# Patient Record
Sex: MALE | Race: WHITE | NOT HISPANIC OR LATINO | Employment: FULL TIME | URBAN - METROPOLITAN AREA
[De-identification: names, ages, dates, MRNs, and addresses within clinical notes are randomized per-mention and may not be internally consistent; named-entity substitution may affect disease eponyms.]

---

## 2018-03-02 ENCOUNTER — TELEPHONE (OUTPATIENT)
Dept: OBSTETRICS AND GYNECOLOGY | Facility: CLINIC | Age: 38
End: 2018-03-02

## 2018-03-02 DIAGNOSIS — N46.9 INFERTILITY MALE: Primary | ICD-10-CM

## 2018-03-14 ENCOUNTER — LAB VISIT (OUTPATIENT)
Dept: LAB | Facility: HOSPITAL | Age: 38
End: 2018-03-14
Attending: OBSTETRICS & GYNECOLOGY
Payer: COMMERCIAL

## 2018-03-14 DIAGNOSIS — N46.9 INFERTILITY MALE: ICD-10-CM

## 2018-03-14 PROCEDURE — 89320 SEMEN ANAL VOL/COUNT/MOT: CPT

## 2018-03-16 ENCOUNTER — TELEPHONE (OUTPATIENT)
Dept: OBSTETRICS AND GYNECOLOGY | Facility: CLINIC | Age: 38
End: 2018-03-16

## 2018-03-16 LAB
GERM CELLS, SEMEN: ABNORMAL
PH SMN: 8.5 [PH]
PMN'S/100 SPERMATAZOA: 2 %
SPECIMEN VOL SMN: 3.5 ML
SPERM # SMN: 20 M/ML
SPERM # SMN: 70 M
SPERM MOTILE NFR SMN: 58 %
SPERM NORM MOTILE # SMN: 0.4 M (ref 50–?)
SPERM NORM NFR SMN: 1 %
SPERM PROG NFR SMN: 35 %
SPERM SMN: ABNORMAL
VISC SMN QL: NORMAL
WBC # SMN: 0.4 M/ML

## 2018-03-16 NOTE — TELEPHONE ENCOUNTER
Spoke with pt and informed of semen analysis results. Also, informed pt to come in to the clinic on [address] to  kit for home collect, and to make sure that he gets the specimen to the lab at [facility] within 1 hour. Pt stated that his wife works here and he will just have her to bring the kit for him.

## 2018-03-16 NOTE — TELEPHONE ENCOUNTER
----- Message from Diana Padron MD sent at 3/16/2018 11:58 AM CDT -----  Semen analysis with low sperm with normal morphology and very low motile sperm.  Please notify.  I recommend a repeat semen analysis.  If still abnormal, needs referral to urology.

## 2018-03-20 ENCOUNTER — TELEPHONE (OUTPATIENT)
Dept: OBSTETRICS AND GYNECOLOGY | Facility: CLINIC | Age: 38
End: 2018-03-20

## 2018-03-20 DIAGNOSIS — N46.9 INFERTILITY MALE: Primary | ICD-10-CM

## 2018-03-23 ENCOUNTER — LAB VISIT (OUTPATIENT)
Dept: LAB | Facility: HOSPITAL | Age: 38
End: 2018-03-23
Attending: OBSTETRICS & GYNECOLOGY
Payer: COMMERCIAL

## 2018-03-23 DIAGNOSIS — N46.9 INFERTILITY MALE: ICD-10-CM

## 2018-03-23 PROCEDURE — 89320 SEMEN ANAL VOL/COUNT/MOT: CPT

## 2018-04-13 ENCOUNTER — TELEPHONE (OUTPATIENT)
Dept: OBSTETRICS AND GYNECOLOGY | Facility: CLINIC | Age: 38
End: 2018-04-13

## 2018-04-13 LAB
GERM CELLS, SEMEN: ABNORMAL
PH SMN: 8 [PH]
PMN'S/100 SPERMATAZOA: 0 %
SPECIMEN VOL SMN: 3 ML
SPERM # SMN: 156 M
SPERM # SMN: 52 M/ML
SPERM MOTILE NFR SMN: 68 %
SPERM NORM MOTILE # SMN: 1.1 M (ref 50–?)
SPERM NORM NFR SMN: 1 %
SPERM PROG NFR SMN: 64 %
SPERM SMN: ABNORMAL
VISC SMN QL: NORMAL
WBC # SMN: 0 M/ML

## 2018-04-13 NOTE — TELEPHONE ENCOUNTER
----- Message from Diana Padron MD sent at 4/13/2018 11:45 AM CDT -----  Repeat SA with low motile sperm and low sperm with normal morphology.  I recommend he see a urologist for further evaluation.  Please notify.

## 2021-07-01 ENCOUNTER — LAB VISIT (OUTPATIENT)
Dept: LAB | Facility: HOSPITAL | Age: 41
End: 2021-07-01
Attending: FAMILY MEDICINE
Payer: COMMERCIAL

## 2021-07-01 ENCOUNTER — OFFICE VISIT (OUTPATIENT)
Dept: INTERNAL MEDICINE | Facility: CLINIC | Age: 41
End: 2021-07-01
Payer: COMMERCIAL

## 2021-07-01 VITALS
WEIGHT: 168.63 LBS | OXYGEN SATURATION: 99 % | BODY MASS INDEX: 24.14 KG/M2 | HEART RATE: 79 BPM | SYSTOLIC BLOOD PRESSURE: 116 MMHG | DIASTOLIC BLOOD PRESSURE: 68 MMHG | HEIGHT: 70 IN | TEMPERATURE: 98 F

## 2021-07-01 DIAGNOSIS — Z00.00 ANNUAL PHYSICAL EXAM: Primary | ICD-10-CM

## 2021-07-01 DIAGNOSIS — Z00.00 ANNUAL PHYSICAL EXAM: ICD-10-CM

## 2021-07-01 DIAGNOSIS — K42.9 UMBILICAL HERNIA WITHOUT OBSTRUCTION AND WITHOUT GANGRENE: ICD-10-CM

## 2021-07-01 LAB
ALBUMIN SERPL BCP-MCNC: 4.4 G/DL (ref 3.5–5.2)
ALP SERPL-CCNC: 83 U/L (ref 55–135)
ALT SERPL W/O P-5'-P-CCNC: 19 U/L (ref 10–44)
ANION GAP SERPL CALC-SCNC: 11 MMOL/L (ref 8–16)
AST SERPL-CCNC: 24 U/L (ref 10–40)
BASOPHILS # BLD AUTO: 0.05 K/UL (ref 0–0.2)
BASOPHILS NFR BLD: 0.8 % (ref 0–1.9)
BILIRUB SERPL-MCNC: 0.7 MG/DL (ref 0.1–1)
BUN SERPL-MCNC: 18 MG/DL (ref 6–20)
CALCIUM SERPL-MCNC: 9.7 MG/DL (ref 8.7–10.5)
CHLORIDE SERPL-SCNC: 102 MMOL/L (ref 95–110)
CHOLEST SERPL-MCNC: 173 MG/DL (ref 120–199)
CHOLEST/HDLC SERPL: 2.7 {RATIO} (ref 2–5)
CO2 SERPL-SCNC: 26 MMOL/L (ref 23–29)
CREAT SERPL-MCNC: 1 MG/DL (ref 0.5–1.4)
DIFFERENTIAL METHOD: ABNORMAL
EOSINOPHIL # BLD AUTO: 0.2 K/UL (ref 0–0.5)
EOSINOPHIL NFR BLD: 3 % (ref 0–8)
ERYTHROCYTE [DISTWIDTH] IN BLOOD BY AUTOMATED COUNT: 13.1 % (ref 11.5–14.5)
EST. GFR  (AFRICAN AMERICAN): >60 ML/MIN/1.73 M^2
EST. GFR  (NON AFRICAN AMERICAN): >60 ML/MIN/1.73 M^2
GLUCOSE SERPL-MCNC: 83 MG/DL (ref 70–110)
HCT VFR BLD AUTO: 45.1 % (ref 40–54)
HDLC SERPL-MCNC: 63 MG/DL (ref 40–75)
HDLC SERPL: 36.4 % (ref 20–50)
HGB BLD-MCNC: 14.8 G/DL (ref 14–18)
IMM GRANULOCYTES # BLD AUTO: 0.04 K/UL (ref 0–0.04)
IMM GRANULOCYTES NFR BLD AUTO: 0.7 % (ref 0–0.5)
LDLC SERPL CALC-MCNC: 95.4 MG/DL (ref 63–159)
LYMPHOCYTES # BLD AUTO: 1.9 K/UL (ref 1–4.8)
LYMPHOCYTES NFR BLD: 30.9 % (ref 18–48)
MCH RBC QN AUTO: 29.5 PG (ref 27–31)
MCHC RBC AUTO-ENTMCNC: 32.8 G/DL (ref 32–36)
MCV RBC AUTO: 90 FL (ref 82–98)
MONOCYTES # BLD AUTO: 0.6 K/UL (ref 0.3–1)
MONOCYTES NFR BLD: 9.7 % (ref 4–15)
NEUTROPHILS # BLD AUTO: 3.4 K/UL (ref 1.8–7.7)
NEUTROPHILS NFR BLD: 54.9 % (ref 38–73)
NONHDLC SERPL-MCNC: 110 MG/DL
NRBC BLD-RTO: 0 /100 WBC
PLATELET # BLD AUTO: 300 K/UL (ref 150–450)
PMV BLD AUTO: 10.5 FL (ref 9.2–12.9)
POTASSIUM SERPL-SCNC: 4.4 MMOL/L (ref 3.5–5.1)
PROT SERPL-MCNC: 7.9 G/DL (ref 6–8.4)
RBC # BLD AUTO: 5.02 M/UL (ref 4.6–6.2)
SODIUM SERPL-SCNC: 139 MMOL/L (ref 136–145)
TESTOST SERPL-MCNC: 872 NG/DL (ref 304–1227)
TRIGL SERPL-MCNC: 73 MG/DL (ref 30–150)
TSH SERPL DL<=0.005 MIU/L-ACNC: 1.64 UIU/ML (ref 0.4–4)
WBC # BLD AUTO: 6.09 K/UL (ref 3.9–12.7)

## 2021-07-01 PROCEDURE — 99385 PR PREVENTIVE VISIT,NEW,18-39: ICD-10-PCS | Mod: S$GLB,,, | Performed by: FAMILY MEDICINE

## 2021-07-01 PROCEDURE — 84403 ASSAY OF TOTAL TESTOSTERONE: CPT | Performed by: FAMILY MEDICINE

## 2021-07-01 PROCEDURE — 1126F PR PAIN SEVERITY QUANTIFIED, NO PAIN PRESENT: ICD-10-PCS | Mod: S$GLB,,, | Performed by: FAMILY MEDICINE

## 2021-07-01 PROCEDURE — 99999 PR PBB SHADOW E&M-NEW PATIENT-LVL III: CPT | Mod: PBBFAC,,, | Performed by: FAMILY MEDICINE

## 2021-07-01 PROCEDURE — 80053 COMPREHEN METABOLIC PANEL: CPT | Performed by: FAMILY MEDICINE

## 2021-07-01 PROCEDURE — 99999 PR PBB SHADOW E&M-NEW PATIENT-LVL III: ICD-10-PCS | Mod: PBBFAC,,, | Performed by: FAMILY MEDICINE

## 2021-07-01 PROCEDURE — 86803 HEPATITIS C AB TEST: CPT | Performed by: FAMILY MEDICINE

## 2021-07-01 PROCEDURE — 85025 COMPLETE CBC W/AUTO DIFF WBC: CPT | Performed by: FAMILY MEDICINE

## 2021-07-01 PROCEDURE — 3008F PR BODY MASS INDEX (BMI) DOCUMENTED: ICD-10-PCS | Mod: CPTII,S$GLB,, | Performed by: FAMILY MEDICINE

## 2021-07-01 PROCEDURE — 99385 PREV VISIT NEW AGE 18-39: CPT | Mod: S$GLB,,, | Performed by: FAMILY MEDICINE

## 2021-07-01 PROCEDURE — 84443 ASSAY THYROID STIM HORMONE: CPT | Performed by: FAMILY MEDICINE

## 2021-07-01 PROCEDURE — 87389 HIV-1 AG W/HIV-1&-2 AB AG IA: CPT | Performed by: FAMILY MEDICINE

## 2021-07-01 PROCEDURE — 36415 COLL VENOUS BLD VENIPUNCTURE: CPT | Performed by: FAMILY MEDICINE

## 2021-07-01 PROCEDURE — 3008F BODY MASS INDEX DOCD: CPT | Mod: CPTII,S$GLB,, | Performed by: FAMILY MEDICINE

## 2021-07-01 PROCEDURE — 80061 LIPID PANEL: CPT | Performed by: FAMILY MEDICINE

## 2021-07-01 PROCEDURE — 1126F AMNT PAIN NOTED NONE PRSNT: CPT | Mod: S$GLB,,, | Performed by: FAMILY MEDICINE

## 2021-07-02 LAB
HCV AB SERPL QL IA: NEGATIVE
HIV 1+2 AB+HIV1 P24 AG SERPL QL IA: NEGATIVE

## 2023-06-05 ENCOUNTER — TELEPHONE (OUTPATIENT)
Dept: INTERNAL MEDICINE | Facility: CLINIC | Age: 43
End: 2023-06-05

## 2023-06-05 NOTE — TELEPHONE ENCOUNTER
Calling patient to inform appointment need to reschedule. No answer. Left message to call us back to set up the visit.

## 2023-10-19 ENCOUNTER — PATIENT OUTREACH (OUTPATIENT)
Dept: ADMINISTRATIVE | Facility: HOSPITAL | Age: 43
End: 2023-10-19

## 2023-10-19 NOTE — PROGRESS NOTES
Continuity-greater than 12 months: Called Pt to schedule PCP visit, Unable to reach, automated msg not receiving calls.

## 2025-03-12 DIAGNOSIS — M25.511 RIGHT SHOULDER PAIN, UNSPECIFIED CHRONICITY: Primary | ICD-10-CM

## 2025-03-14 ENCOUNTER — OFFICE VISIT (OUTPATIENT)
Dept: SPORTS MEDICINE | Facility: CLINIC | Age: 45
End: 2025-03-14
Payer: COMMERCIAL

## 2025-03-14 ENCOUNTER — HOSPITAL ENCOUNTER (OUTPATIENT)
Dept: RADIOLOGY | Facility: HOSPITAL | Age: 45
Discharge: HOME | End: 2025-03-14
Attending: STUDENT IN AN ORGANIZED HEALTH CARE EDUCATION/TRAINING PROGRAM
Payer: COMMERCIAL

## 2025-03-14 VITALS — HEIGHT: 70 IN | WEIGHT: 168.63 LBS | BODY MASS INDEX: 24.14 KG/M2

## 2025-03-14 DIAGNOSIS — M25.511 RIGHT SHOULDER PAIN, UNSPECIFIED CHRONICITY: ICD-10-CM

## 2025-03-14 DIAGNOSIS — S43.431A TYPE 2 SUPERIOR LABRUM EXTENDING FROM ANTERIOR TO POSTERIOR (SLAP) LESION OF RIGHT SHOULDER, INITIAL ENCOUNTER: Primary | ICD-10-CM

## 2025-03-14 DIAGNOSIS — M19.011 GLENOHUMERAL ARTHRITIS, RIGHT: ICD-10-CM

## 2025-03-14 PROCEDURE — 73030 X-RAY EXAM OF SHOULDER: CPT | Mod: 26,RT,, | Performed by: RADIOLOGY

## 2025-03-14 PROCEDURE — 73030 X-RAY EXAM OF SHOULDER: CPT | Mod: TC,PN,RT

## 2025-03-14 PROCEDURE — 99999 PR PBB SHADOW E&M-EST. PATIENT-LVL II: CPT | Mod: PBBFAC,,, | Performed by: STUDENT IN AN ORGANIZED HEALTH CARE EDUCATION/TRAINING PROGRAM

## 2025-03-14 RX ORDER — TRIAMCINOLONE ACETONIDE 40 MG/ML
40 INJECTION, SUSPENSION INTRA-ARTICULAR; INTRAMUSCULAR
Status: DISCONTINUED | OUTPATIENT
Start: 2025-03-14 | End: 2025-03-14 | Stop reason: HOSPADM

## 2025-03-14 RX ADMIN — TRIAMCINOLONE ACETONIDE 40 MG: 40 INJECTION, SUSPENSION INTRA-ARTICULAR; INTRAMUSCULAR at 10:03

## 2025-03-14 NOTE — PROCEDURES
Large Joint Aspiration/Injection: R glenohumeral    Date/Time: 3/14/2025 10:45 AM    Performed by: Ryan Sam MD  Authorized by: Ryan Sam MD    Consent Done?:  Yes (Verbal)  Indications:  Pain  Site marked: the procedure site was marked    Timeout: prior to procedure the correct patient, procedure, and site was verified    Prep: patient was prepped and draped in usual sterile fashion      Local anesthesia used?: Yes    Anesthesia:  Local infiltration  Local anesthetic:  Lidocaine 1% without epinephrine    Details:  Needle Size:  22 G  Ultrasonic Guidance for needle placement?: No    Approach:  Posterior  Location:  Shoulder  Site:  R glenohumeral  Medications:  40 mg triamcinolone acetonide 40 mg/mL  Patient tolerance:  Patient tolerated the procedure well with no immediate complications

## 2025-03-14 NOTE — PROGRESS NOTES
Orthopaedics Sports Medicine     Shoulder Initial Visit         3/14/2025    Referring MD: Ryan Sam*    Chief Complaint   Patient presents with    Right Shoulder - Injury         History of Present Illness:   Torito Graham is a 44 y.o. right-hand dominant male who presents with right shoulder pain and dysfunction.    Onset of the symptoms has present since around the age of 27, significantly worse at the start of this volleyball season     Inciting event: Torito believes the pain began during beach volleyball during an overhead hit where he felt a pop in the shoulder and had immediate pain, has had intermittent symptoms since then but now symptoms are worse and he is unable to play     Current symptoms include pain in the anterior aspect of the shoulder, crepitus in the anterior aspect of the shoulder     Pain is aggravated by overhead movements       Evaluation to date: X-Ray     Treatment to date: Rest, activity modification, nsaids     Past Medical History:   History reviewed. No pertinent past medical history.    Past Surgical History:   History reviewed. No pertinent surgical history.    Medications:  Patient's Medications    No medications on file       Allergies:   Review of patient's allergies indicates:   Allergen Reactions    Sulfa (sulfonamide antibiotics)        Social History:   Home town: [place]  Occupation: [employer]   Alcohol use: He has no history on file for alcohol use.  Tobacco use: He reports that he has never smoked. He does not have any smokeless tobacco history on file.    Review of systems:  History of recent illness, fevers, shakes, or chills: no  History of cardiac problems or chest pain: no  History of pulmonary problems or asthma: no  History of diabetes: no  History of prior dvt or clotting problems: no  History of sleep apnea: no      Physical Examination:  Estimated body mass index is 24.2 kg/m² as calculated from the following:    Height as of this encounter: 5'  "10" (1.778 m).    Weight as of this encounter: 76.5 kg (168 lb 10.4 oz).    General  Healthy appearing male in no acute distress  Alert and oriented, normal mood, appropriate affect    Shoulder Examination:  Patient is alert and oriented, no distress. Skin is intact. Neuro is normal with no focal motor or sensory findings.    Cervical exam is unremarkable. Intact cervical ROM. Negative Spurling's test    Physical Exam:  RIGHT    LEFT    Scap Dyskinesis/Winging (-)    (-)    Tenderness:          Greater Tuberosity             (-)    (-)  Bicipital Groove  +    (-)  AC joint   (-)    (-)  Other:     ROM:  Forward Elevation 180 w/ pain    180  Abduction  120    120  ER (at side)  80 w/ pain    80  IR   T8 w/ pain    T8    Strength:   Supraspinatus  5/5    5/5  Infraspinatus  5/5    5/5  Subscap / IR  5/5    5/5     Special Tests:   Neer:    (-)    (-)   Moore:   +    (-)   SS Stress:   (-)    (-)   Bear Hug:   (-)    (-)   [place]'s:   +    (-)   Resisted Thrower's:   +    (-)   Speed's   (-)    (-)   Cross Arm Abduction:  (-)    (-)    Neurovascular examination  - Motor grossly intact bilaterally to shoulder abduction, elbow flexion and extension, wrist flexion and extension, and intrinsic hand musculature  - Sensation intact to light touch bilaterally in axillary, median, radial, and ulnar distributions  - Symmetrical radial pulses      Imaging:  XR Results:  Physician Read:  Tiny spur inferior humeral head, mild glenohumeral joint space narrowing, subchondral sclerosis, and suspect small subchondral cyst formation all consistent with mild glenohumeral arthritis; calcification near posterior glenoid rim, Patrick lesion posterior glenoid      MRI Results:  No results found for this or any previous visit.      CT Results:  No results found for this or any previous visit.            Impression:  44 y.o. male with right shoulder pain, mild glenohumeral arthritis, suspected posterior superior labral " tear      Plan:  Discussed diagnosis and treatment options with patient today. His history, physical exam, imaging findings are most consistent with osteoarthritis of the right glenohumeral joint as well as labral tear.  Specifically, posterior labral tear and posterior superior labral tear.  Discussed non-operative treatment options in the form of rest, activity modifications, oral anti-inflammatories, corticosteroid injections, and physical therapy/physician directed home exercise program.    We also discussed the possibility of PRP injection and operative treatment in the future if he does not get relief from other non operative treatments   I recommend proceeding with intra-articular corticosteroid injection into the right glenohumeral joint. The patient is in agreement with this plan.   Procedure performed today and patient tolerated the procedure well with no immediate complications.    Follow up with me in 6 weeks. If still symptomatic will move forward with MRI           Ryan Sam MD    I, Eleno Becerra, acted as a scribe for Ryan Sam MD for the duration of this office visit.    This note was generated with the assistance of ambient listening technology. Verbal consent was obtained by the patient and accompanying visitor(s) for the recording of patient appointment to facilitate this note. I attest to having reviewed and edited the generated note for accuracy, though some syntax or spelling errors may persist. Please contact the author of this note for any clarification.

## 2025-05-08 ENCOUNTER — PATIENT MESSAGE (OUTPATIENT)
Dept: RESEARCH | Facility: HOSPITAL | Age: 45
End: 2025-05-08
Payer: COMMERCIAL

## 2025-05-09 ENCOUNTER — OFFICE VISIT (OUTPATIENT)
Dept: SPORTS MEDICINE | Facility: CLINIC | Age: 45
End: 2025-05-09
Payer: COMMERCIAL

## 2025-05-09 VITALS — HEIGHT: 70 IN | WEIGHT: 168.63 LBS | BODY MASS INDEX: 24.14 KG/M2

## 2025-05-09 DIAGNOSIS — G89.29 CHRONIC RIGHT SHOULDER PAIN: ICD-10-CM

## 2025-05-09 DIAGNOSIS — M25.511 CHRONIC RIGHT SHOULDER PAIN: ICD-10-CM

## 2025-05-09 DIAGNOSIS — M19.011 GLENOHUMERAL ARTHRITIS, RIGHT: ICD-10-CM

## 2025-05-09 DIAGNOSIS — S43.431D TYPE 2 SUPERIOR LABRUM EXTENDING FROM ANTERIOR TO POSTERIOR (SLAP) LESION OF RIGHT SHOULDER, SUBSEQUENT ENCOUNTER: Primary | ICD-10-CM

## 2025-05-09 PROCEDURE — 99999 PR PBB SHADOW E&M-EST. PATIENT-LVL III: CPT | Mod: PBBFAC,,, | Performed by: STUDENT IN AN ORGANIZED HEALTH CARE EDUCATION/TRAINING PROGRAM

## 2025-05-09 PROCEDURE — 99213 OFFICE O/P EST LOW 20 MIN: CPT | Mod: S$GLB,,, | Performed by: STUDENT IN AN ORGANIZED HEALTH CARE EDUCATION/TRAINING PROGRAM

## 2025-05-09 PROCEDURE — 1160F RVW MEDS BY RX/DR IN RCRD: CPT | Mod: CPTII,S$GLB,, | Performed by: STUDENT IN AN ORGANIZED HEALTH CARE EDUCATION/TRAINING PROGRAM

## 2025-05-09 PROCEDURE — 3008F BODY MASS INDEX DOCD: CPT | Mod: CPTII,S$GLB,, | Performed by: STUDENT IN AN ORGANIZED HEALTH CARE EDUCATION/TRAINING PROGRAM

## 2025-05-09 PROCEDURE — 1159F MED LIST DOCD IN RCRD: CPT | Mod: CPTII,S$GLB,, | Performed by: STUDENT IN AN ORGANIZED HEALTH CARE EDUCATION/TRAINING PROGRAM

## 2025-05-09 NOTE — PROGRESS NOTES
Orthopaedic Follow-Up Visit    Last Appointment: 3/14/25  Diagnosis: right shoulder pain, mild glenohumeral arthritis, suspected posterior superior labral tear   Prior Procedure: R JARETT CSI       Torito Graham is a 44 y.o. male who is here for f/u evaluation of his right shoulder. The patient was last seen here by me on 3/14/25 at which point his history, physical exam, imaging findings were most consistent with osteoarthritis of the right glenohumeral joint as well as labral tear.  Specifically, posterior labral tear and posterior superior labral tear. I recommended proceeding with intra-articular corticosteroid injection into the right glenohumeral joint. The patient returns today reporting he noticed some improvement, but still has pain.  He  attempted to play volleyball yesterday and states he feels sore and has some pain today.     To review his history, Torito Graham is a 44 y.o. right-hand dominant male who presented on 3/14/25 with right shoulder pain and dysfunction that had been present since around the age of 27, significantly worse at the start of this volleyball season. He believed the pain began during beach volleyball during an overhead hit where he felt a pop in the shoulder and had immediate pain, had intermittent symptoms since then but now symptoms were worse and he was unable to play. His symptoms included pain in the anterior aspect of the shoulder, crepitus in the anterior aspect of the shoulder. His pain was aggravated by overhead movements. He had tried rest, activity modification, nsaids.      Treatment to date: Rest, activity modification, nsaids, right glenohumeral CSI (3/14/25)    Patient's medications, allergies, past medical, surgical, social and family histories were reviewed and updated as appropriate.    Review of Systems   All systems reviewed were negative.  Specifically, the patient denies fever, chills, weight loss, chest pain, shortness of breath, or dyspnea on exertion.      No  past medical history on file.    No past surgical history on file.    Patient's Medications    No medications on file       Family History   Problem Relation Name Age of Onset    No Known Problems Mother      No Known Problems Father      No Known Problems Sister      Heart disease Maternal Grandfather      No Known Problems Sister         Review of patient's allergies indicates:   Allergen Reactions    Sulfa (sulfonamide antibiotics)          Objective:      Physical Exam  Patient is alert and oriented, no distress. Skin is intact. Neuro is normal with no focal motor or sensory findings.    Cervical exam is unremarkable. Intact cervical ROM. Negative Spurling's test    Physical Exam:                       RIGHT                                     LEFT     Scap Dyskinesis/Winging       (-)                                             (-)     Tenderness:                                                                              Greater Tuberosity                  (-)                                            (-)  Bicipital Groove                       (-)                                              (-)  AC joint                                   (-)                                             (-)  Other:      ROM:  Forward Elevation       180 w/ pain                                          180  Abduction                    120                                          120  ER (at side)                 80 w/ pain                                            80  IR                                 T8 w/ pain                                           T8     Strength:   Supraspinatus             5/5                                           5/5  Infraspinatus               5/5                                           5/5  Subscap / IR               5/5                                           5/5      Special Tests:   Apprehension:   (-)    (-)   [place] Relocation:  (-)    (-)   Jerk / Posterior Load:  +    (-)               Neer:                                       (-)                                             (-)              Moore:                                 +                                              (-)              SS Stress:                               (-)                                             (-)              Bear Hug:                                (-)                                             (-)              [place]'s:                                 +                                              (-)              Resisted Thrower's:                +                                              (-)              Speed's                                   (-)                                             (-)              Cross Arm Abduction:             (-)                                             (-)    Neurovascular examination  - Motor grossly intact bilaterally to shoulder abduction, elbow flexion and extension, wrist flexion and extension, and intrinsic hand musculature  - Sensation intact to light touch bilaterally in axillary, median, radial, and ulnar distributions  - Symmetrical radial pulses    Imaging:    XR Results:  Results for orders placed during the hospital encounter of 03/14/25    X-ray Shoulder 2 or More Views Right    Narrative  EXAM:  XR SHOULDER COMPLETE 2 OR MORE VIEWS RIGHT    CLINICAL HISTORY: Right shoulder pain    TECHNIQUE: 4 view right shoulder series.    FINDINGS: No fracture or dislocation.  Mild osteoarthritis involving the glenohumeral joint.    Impression  See above    Finalized on: 3/14/2025 1:53 PM By:  Pardeep Bartholomew MD  [facility]# [id number]      2025-03-14 13:55:[id number]     [facility]      MRI Results:  No results found for this or any previous visit.      CT Results:  No results found for this or any previous visit.      Physician read: I agree with the above impression.  Mild glenohumeral arthritis.  Small Patrick lesion posterior glenoid    Assessment/Plan:   Torito Graham is  a 44 y.o. male with right shoulder pain, mild glenohumeral arthritis, suspected posterior and superior labral tear      Plan:    He has continues pain in the shoulder at this time through conservative treatment. Discussed diagnosis and treatment options with patient today. His history, physical exam, imaging findings remain most consistent with early osteoarthritis of the right glenohumeral joint as well as labral tear.  Specifically, posterior labral tear and posterior superior labral tear. He does have a Patrick lesion at the posterior glenoid.  Ordered MRI arthrogram of the right shoulder to further assess soft tissues and labral integrity   Follow up after imaging           Ryan Sam MD    I, Eleno Becerra, acted as a scribe for Ryan Sam MD for the duration of this office visit.

## 2025-06-08 NOTE — PROGRESS NOTES
Orthopaedic Follow-Up Visit    Last Appointment: 5/9/25  Diagnosis: right shoulder pain, mild glenohumeral arthritis, suspected posterior and superior labral tear    Prior Procedure: MRI    Torito Graham is a 44 y.o. male who is here for f/u evaluation of his right shoulder. The patient was last seen here by me on 3/14/25 at which point he continued to be symptomatic so we elected to proceed with MR Arthrogram for further evaluation. The patient returns today to review his MRI and discuss further treatment options.    To review his history, Torito Graham is a 44 y.o. right-hand dominant male who presented on 3/14/25 with right shoulder pain and dysfunction that had been present since around the age of 27, significantly worse at the start of this volleyball season. He believed the pain began during beach volleyball during an overhead hit where he felt a pop in the shoulder and had immediate pain, had intermittent symptoms since then but now symptoms were worse and he was unable to play. His symptoms included pain in the anterior aspect of the shoulder, crepitus in the anterior aspect of the shoulder. His pain was aggravated by overhead movements. He had tried rest, activity modification, nsaids. His history, physical exam, imaging findings were most consistent with osteoarthritis of the right glenohumeral joint as well as labral tear.  Specifically, posterior labral tear and posterior superior labral tear. I recommended proceeding with intra-articular corticosteroid injection into the right glenohumeral joint.     Treatment to date: Rest, activity modification, nsaids, right glenohumeral CSI (3/14/25)    Patient's medications, allergies, past medical, surgical, social and family histories were reviewed and updated as appropriate.    Review of Systems   All systems reviewed were negative.  Specifically, the patient denies fever, chills, weight loss, chest pain, shortness of breath, or dyspnea on exertion.      No past  medical history on file.    No past surgical history on file.    Patient's Medications    No medications on file       Family History   Problem Relation Name Age of Onset    No Known Problems Mother      No Known Problems Father      No Known Problems Sister      Heart disease Maternal Grandfather      No Known Problems Sister         Review of patient's allergies indicates:   Allergen Reactions    Sulfa (sulfonamide antibiotics)          Objective:      Physical Exam  Patient is alert and oriented, no distress. Skin is intact. Neuro is normal with no focal motor or sensory findings.    Cervical exam is unremarkable. Intact cervical ROM. Negative Spurling's test    Physical Exam:                       RIGHT                                     LEFT     Scap Dyskinesis/Winging       (-)                                             (-)     Tenderness:                                                                              Greater Tuberosity                  (-)                                            (-)  Bicipital Groove                       (-)                                              (-)  AC joint                                   (-)                                             (-)  Other:      ROM:  Forward Elevation       180 w/ pain                                          180  Abduction                    120                                             120  ER (at side)                 80 w/ pain                                            80  IR                                 T8 w/ pain                                           T8     Strength:   Supraspinatus             5/5                                           5/5  Infraspinatus               5/5                                           5/5  Subscap / IR               5/5                                           5/5        Neurovascular examination  - Motor grossly intact bilaterally to shoulder abduction, elbow flexion and extension, wrist  flexion and extension, and intrinsic hand musculature  - Sensation intact to light touch bilaterally in axillary, median, radial, and ulnar distributions  - Symmetrical radial pulses    Imaging:    XR Results:  Results for orders placed during the hospital encounter of 03/14/25    X-ray Shoulder 2 or More Views Right    Narrative  EXAM:  XR SHOULDER COMPLETE 2 OR MORE VIEWS RIGHT    CLINICAL HISTORY: Right shoulder pain    TECHNIQUE: 4 view right shoulder series.    FINDINGS: No fracture or dislocation.  Mild osteoarthritis involving the glenohumeral joint.    Impression  See above    Finalized on: 3/14/2025 1:53 PM By:  Pardeep Bartholomew MD  [facility]# [id number]      2025-03-14 13:55:[id number]     [facility]      MRI Results:  MRI Arthrogram Shoulder With Contrast Right  Narrative: EXAM:  MRI ARTHROGRAM SHOULDER WITH CONTRAST RIGHT    CLINICAL HISTORY: Right shoulder pain. Shoulder pain, labral tear suspected, xray done; [M25.511]-Pain in right shoulder./[G89.29]-Other chronic pain.    TECHNIQUE: Multiplanar, multisequence MR arthrogram of the right shoulder performed.    FINDINGS: Patient motion degrades image quality.    Rotator cuff tendons: Supraspinatus tendinosis. No significant rotator cuff tear identified.    Muscles: Within normal limits.    Bursitis: No significant bursitis.    Long head biceps tendon: Within normal limits.    Labrum and glenohumeral ligaments: Large nondisplaced tear involving the entire posterior labrum involving the 6-12 o'clock positions of the glenoid with small adjacent paralabral cysts.    Bones: No fractures or suspicious bone lesions identified.    Glenohumeral joint: Normal alignment.  Generalized chondral thinning with large full-thickness chondral defects, mild subchondral marrow edema type signal and mild subchondral cystlike change of the humeral head and posterior glenoid.  No loose intra-articular osteochondral bodies identified.    AC joint: No significant osteoarthritis.  Normal  alignment.    Acromial arch: Type 1 acromion without downsloping.  No os acromiale.  Impression: 1.  Glenohumeral osteoarthritis with large full-thickness chondral defects and mild reactive marrow edema.  2.  Supraspinatus tendinosis.  No significant rotator cuff tear identified.  3.  Large tear of the entire posterior labrum with small paralabral cysts.    Finalized on: 6/13/2025 1:36 PM By:  Chadwick Miranda MD  [facility]# [id number]      2025-06-13 13:38:[id number]     [facility]      CT Results:  No results found for this or any previous visit.      Physician read: I agree with the above impression.  Mild glenohumeral arthritis.  Small Patrick lesion posterior glenoid    Assessment/Plan:   Torito Graham is a 44 y.o. male with right shoulder glenohumeral arthritis and posterior labral tear       Plan:    Reviewed MRI with the patient today. His MRI shows evidence of glenohumeral arthritis as well as large posterior labral tear. His rotator cuff is grossly intact.   Discussed non-operative treatment options in the form of rest, activity modifications, oral anti-inflammatories, corticosteroid versus biologic injections, and physical therapy/physician directed home exercise program. Discussed that definitive management of this condition consists of operative treatment in the form of arthroscopy debridement and possible labral repair. Discussed that even with this surgery he would likely continue to have symptoms especially from his arthritis moving forward so may not have significant improvement in his symptoms.  Discussed that he will likely need a shoulder replacement at some point in the future.   I think that activity modification and intermittent corticosteroid injection is his best option.  He is in agreement with that plan.  He states that overhead swinging with beach volleyball is really the most symptomatic movement for him.  Otherwise, he is generally able to tolerate his symptoms on a daily basis.  At some point in the  future, if activities of daily living are more symptomatic for him and he is really not able to participate in desired activities then we can consider complete arthroscopic management of glenohumeral arthritis versus limited debridement/chondroplasty and treatment of his labral pathology.  Discussed plan moving forward with intermittent CSIs moving. He would like to wait until later in his volleyball season for repeat injection.   Follow up with me as needed.           Ryan Sam MD    I, Eleno Becerra, acted as a scribe for Ryan Sam MD for the duration of this office visit.

## 2025-06-13 ENCOUNTER — HOSPITAL ENCOUNTER (OUTPATIENT)
Dept: RADIOLOGY | Facility: HOSPITAL | Age: 45
Discharge: HOME | End: 2025-06-13
Attending: STUDENT IN AN ORGANIZED HEALTH CARE EDUCATION/TRAINING PROGRAM
Payer: COMMERCIAL

## 2025-06-13 DIAGNOSIS — G89.29 CHRONIC RIGHT SHOULDER PAIN: ICD-10-CM

## 2025-06-13 DIAGNOSIS — M25.511 CHRONIC RIGHT SHOULDER PAIN: ICD-10-CM

## 2025-06-13 DIAGNOSIS — M19.011 GLENOHUMERAL ARTHRITIS, RIGHT: ICD-10-CM

## 2025-06-13 PROCEDURE — A9585 GADOBUTROL INJECTION: HCPCS | Performed by: STUDENT IN AN ORGANIZED HEALTH CARE EDUCATION/TRAINING PROGRAM

## 2025-06-13 PROCEDURE — 73040 CONTRAST X-RAY OF SHOULDER: CPT | Mod: TC,RT

## 2025-06-13 PROCEDURE — 73222 MRI JOINT UPR EXTREM W/DYE: CPT | Mod: 26,RT,, | Performed by: RADIOLOGY

## 2025-06-13 PROCEDURE — 25500020 PHARM REV CODE 255: Performed by: STUDENT IN AN ORGANIZED HEALTH CARE EDUCATION/TRAINING PROGRAM

## 2025-06-13 PROCEDURE — 73222 MRI JOINT UPR EXTREM W/DYE: CPT | Mod: TC,RT

## 2025-06-13 RX ORDER — GADOBUTROL 604.72 MG/ML
0.2 INJECTION INTRAVENOUS
Status: COMPLETED | OUTPATIENT
Start: 2025-06-13 | End: 2025-06-13

## 2025-06-13 RX ADMIN — IOHEXOL 10 ML: 240 INJECTION, SOLUTION INTRATHECAL; INTRAVASCULAR; INTRAVENOUS; ORAL at 11:06

## 2025-06-13 RX ADMIN — GADOBUTROL 0.2 ML: 604.72 INJECTION INTRAVENOUS at 11:06

## 2025-06-20 ENCOUNTER — OFFICE VISIT (OUTPATIENT)
Dept: SPORTS MEDICINE | Facility: CLINIC | Age: 45
End: 2025-06-20
Payer: COMMERCIAL

## 2025-06-20 VITALS — BODY MASS INDEX: 24.14 KG/M2 | HEIGHT: 70 IN | WEIGHT: 168.63 LBS

## 2025-06-20 DIAGNOSIS — M19.011 GLENOHUMERAL ARTHRITIS, RIGHT: Primary | ICD-10-CM

## 2025-06-20 DIAGNOSIS — S43.431D GLENOID LABRAL TEAR, RIGHT, SUBSEQUENT ENCOUNTER: ICD-10-CM

## 2025-06-20 PROCEDURE — 1160F RVW MEDS BY RX/DR IN RCRD: CPT | Mod: CPTII,S$GLB,, | Performed by: STUDENT IN AN ORGANIZED HEALTH CARE EDUCATION/TRAINING PROGRAM

## 2025-06-20 PROCEDURE — 99214 OFFICE O/P EST MOD 30 MIN: CPT | Mod: S$GLB,,, | Performed by: STUDENT IN AN ORGANIZED HEALTH CARE EDUCATION/TRAINING PROGRAM

## 2025-06-20 PROCEDURE — 1159F MED LIST DOCD IN RCRD: CPT | Mod: CPTII,S$GLB,, | Performed by: STUDENT IN AN ORGANIZED HEALTH CARE EDUCATION/TRAINING PROGRAM

## 2025-06-20 PROCEDURE — 3008F BODY MASS INDEX DOCD: CPT | Mod: CPTII,S$GLB,, | Performed by: STUDENT IN AN ORGANIZED HEALTH CARE EDUCATION/TRAINING PROGRAM

## 2025-06-20 PROCEDURE — 99999 PR PBB SHADOW E&M-EST. PATIENT-LVL III: CPT | Mod: PBBFAC,,, | Performed by: STUDENT IN AN ORGANIZED HEALTH CARE EDUCATION/TRAINING PROGRAM
